# Patient Record
Sex: MALE | Race: WHITE | URBAN - NONMETROPOLITAN AREA
[De-identification: names, ages, dates, MRNs, and addresses within clinical notes are randomized per-mention and may not be internally consistent; named-entity substitution may affect disease eponyms.]

---

## 2017-10-10 ENCOUNTER — AMBULATORY - GICH (OUTPATIENT)
Dept: FAMILY MEDICINE | Facility: OTHER | Age: 72
End: 2017-10-10

## 2017-10-23 ENCOUNTER — HISTORIC RESULTS (OUTPATIENT)
Dept: ADMINISTRATIVE | Age: 72
End: 2017-10-23

## 2017-12-28 NOTE — TELEPHONE ENCOUNTER
Patient Information     Patient Name MRN Sex Geovani Garcia [id number] Male 1945      Telephone Encounter by Sheila Fuentes at 10/12/2017  9:34 AM     Author:  Sheila Fuentes Service:  (none) Author Type:  (none)     Filed:  10/12/2017  9:35 AM Encounter Date:  10/10/2017 Status:  Signed     :  Sheila Fuentes            PSA TOTAL (SCREEN) CUY (ng/mL)    Date Value   2015 78.40 (H)     PSA TOTAL (DIAGNOSTIC) CUY (ng/mL)    Date Value   2017 0.24

## 2018-01-24 ENCOUNTER — DOCUMENTATION ONLY (OUTPATIENT)
Dept: FAMILY MEDICINE | Facility: OTHER | Age: 73
End: 2018-01-24

## 2018-01-24 RX ORDER — PREDNISONE 5 MG/1
TABLET ORAL
COMMUNITY
Start: 2017-06-12

## 2018-01-24 RX ORDER — LISINOPRIL 5 MG/1
5 TABLET ORAL DAILY
COMMUNITY
Start: 2017-11-08

## 2018-01-24 RX ORDER — SENNOSIDES 8.6 MG
650 CAPSULE ORAL EVERY 8 HOURS PRN
COMMUNITY
Start: 2015-09-14

## 2018-01-24 RX ORDER — DIPHENOXYLATE HYDROCHLORIDE AND ATROPINE SULFATE 2.5; .025 MG/1; MG/1
1 TABLET ORAL DAILY
COMMUNITY
Start: 2012-07-03

## 2018-01-24 RX ORDER — METOPROLOL TARTRATE 50 MG
50 TABLET ORAL 2 TIMES DAILY
COMMUNITY
Start: 2017-09-18

## 2018-01-24 RX ORDER — METHYLPREDNISOLONE 4 MG
500 TABLET, DOSE PACK ORAL DAILY
COMMUNITY
Start: 2012-07-05

## 2018-01-24 RX ORDER — ABIRATERONE ACETATE 250 MG/1
TABLET ORAL
COMMUNITY
Start: 2017-06-12

## 2018-01-24 RX ORDER — SIMVASTATIN 20 MG
20 TABLET ORAL DAILY
COMMUNITY
Start: 2017-09-12

## 2018-01-24 RX ORDER — ACETAMINOPHEN 160 MG
2000 TABLET,DISINTEGRATING ORAL DAILY
COMMUNITY
Start: 2017-08-16

## 2018-01-24 RX ORDER — TRAMADOL HYDROCHLORIDE 50 MG/1
2 TABLET ORAL DAILY PRN
COMMUNITY
Start: 2015-08-04